# Patient Record
Sex: MALE | Race: ASIAN | NOT HISPANIC OR LATINO | URBAN - METROPOLITAN AREA
[De-identification: names, ages, dates, MRNs, and addresses within clinical notes are randomized per-mention and may not be internally consistent; named-entity substitution may affect disease eponyms.]

---

## 2018-10-19 PROBLEM — 31712002: Status: ACTIVE | Noted: 2018-10-19

## 2019-10-31 PROBLEM — 35489007: Status: ACTIVE | Noted: 2019-09-26

## 2019-10-31 PROBLEM — 196731005 GASTRODUODENITIS: Status: ACTIVE | Noted: 2018-11-06

## 2019-10-31 PROBLEM — 313436004 TYPE II DIABETES MELLITUS WITHOUT COMPLICATION: Status: ACTIVE | Noted: 2018-10-19

## 2019-10-31 PROBLEM — 87522002: Status: ACTIVE | Noted: 2018-10-19

## 2019-10-31 PROBLEM — 161665007: Status: ACTIVE | Noted: 2018-10-19

## 2020-02-06 PROBLEM — 79922009 EPIGASTRIC PAIN: Status: ACTIVE | Noted: 2018-10-19

## 2020-08-06 ENCOUNTER — LAB OUTSIDE AN ENCOUNTER (OUTPATIENT)
Dept: URBAN - METROPOLITAN AREA CLINIC 37 | Facility: CLINIC | Age: 65
End: 2020-08-06

## 2020-08-13 ENCOUNTER — OFFICE VISIT (OUTPATIENT)
Dept: URBAN - METROPOLITAN AREA CLINIC 35 | Facility: CLINIC | Age: 65
End: 2020-08-13

## 2020-08-13 VITALS — WEIGHT: 105 LBS | BODY MASS INDEX: 17.93 KG/M2 | HEIGHT: 64 IN

## 2020-08-13 PROBLEM — 415116008: Status: ACTIVE | Noted: 2020-02-06

## 2020-08-13 RX ORDER — PANTOPRAZOLE 20 MG/1
1 TABLET TABLET, DELAYED RELEASE ORAL
Qty: 90 | Refills: 1 | Status: DISCONTINUED | COMMUNITY
Start: 2020-02-06

## 2020-08-13 RX ORDER — MIRTAZAPINE 30 MG/1
1 TABLET AT BEDTIME TABLET ORAL ONCE A DAY
Qty: 90 TABLET | Refills: 1

## 2020-08-13 RX ORDER — PANTOPRAZOLE 20 MG/1
1 TABLET TABLET, DELAYED RELEASE ORAL
Qty: 90 | Refills: 1

## 2020-08-13 RX ORDER — LINACLOTIDE 145 UG/1
1 CAPSULE CAPSULE, GELATIN COATED ORAL ONCE A DAY
Qty: 90 | Refills: 2

## 2020-08-13 RX ORDER — GLIPIZIDE 10 MG/1
1 TABLET TABLET, EXTENDED RELEASE ORAL ONCE A DAY
Status: ACTIVE | COMMUNITY

## 2020-08-13 RX ORDER — DEFERASIROX 360 MG/1
2 TABLETS TABLET, FILM COATED ORAL QD
Status: ON HOLD | COMMUNITY

## 2020-08-13 RX ORDER — MIRTAZAPINE 30 MG/1
1 TABLET AT BEDTIME TABLET ORAL ONCE A DAY
Qty: 90 TABLET | Refills: 1 | Status: ACTIVE | COMMUNITY

## 2020-08-13 RX ORDER — CYCLOSPORINE 25 MG/1
AS DIRECTED CAPSULE, GELATIN COATED ORAL
Status: ACTIVE | COMMUNITY

## 2020-08-13 RX ORDER — DIPHENHYDRAMINE HYDROCHLORIDE 50 MG/1
1 CAPSULE CAPSULE ORAL BID
Qty: 60 CAPSULE | Refills: 5 | Status: ON HOLD | COMMUNITY

## 2020-08-13 RX ORDER — DIPHENHYDRAMINE HYDROCHLORIDE 50 MG/1
1 CAPSULE CAPSULE ORAL BID
Qty: 60 CAPSULE | Refills: 5

## 2020-08-13 RX ORDER — DEFERASIROX 360 MG/1
2 TABLETS TABLET, FILM COATED ORAL BID
Status: ACTIVE | COMMUNITY

## 2020-08-13 RX ORDER — TAMSULOSIN HYDROCHLORIDE 0.4 MG/1
1 CAPSULE CAPSULE ORAL ONCE A DAY
Qty: 30 | Status: ACTIVE | COMMUNITY

## 2020-08-13 RX ORDER — PANTOPRAZOLE SODIUM 40 MG/1
1 TABLET TABLET, DELAYED RELEASE ORAL
Qty: 90 | Refills: 2 | Status: ACTIVE | COMMUNITY

## 2020-08-13 RX ORDER — LINACLOTIDE 145 UG/1
1 CAPSULE CAPSULE, GELATIN COATED ORAL ONCE A DAY
Qty: 90 | Refills: 1 | Status: ACTIVE | COMMUNITY

## 2020-08-13 RX ORDER — SITAGLIPTIN 100 MG/1
1 TABLET TABLET, FILM COATED ORAL ONCE A DAY
Status: ACTIVE | COMMUNITY

## 2020-08-13 NOTE — HPI-MIGRATED HPI
Interim investigations : Labs done on: ->  * 07/28/2020, ordered by PCP:  - BMP: Glu: 115 (H); BUN: 35 (H); Cr: 1.38 (H); Na: 139; K: 4.0;  - Hepatic function panel: Protein, total: 6.8; Alb: 4.2; ALT: 41; AST: 25; ALP: 126; Tbili: 0.7; Bili, direct: 0.2; Bili, indirect: 0..5 - CBC: WBC: 3.6 (L); RBC: 3.57 (L); Hgb: 10.5 (L); Hct: 31.0 (L); Plt: (test not performed due to significant plt clumping);   Interim investigations : Imaging studies: ->  * US abd on 03/17/18 at [place]: No biliary ductal dilatation. Normal hepatic echogenicity. 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 Mild calyx at upper pole of kidney but no hydronephrosis noted. Simple cyst (1.9 cm) in transplant kidney         * CT scan abd/pelvis w/o IV contrast 12/30/17 at [place]: normal liver size and contour. No gallstones. No intra- or extra-hepatic dilatation. No PD dilatation.          * US Abd on 07/11/19: Transplanted kidney - right pelvis is normal. Normal liver. No focal lesion. CBD 4.8 mm;   Follow up OV : Patient is here for a routine OV for -> Epigastric pain/ Gastritis, chronic insomnia, abnormal liver tests, Thrombocytopenia;   Follow up OV : Last OV was -> 6 months ago * GERD/Eosinophilic Esophagitis:  Last EGD on 01/8/2020, moderate chronic inflammation and reactive changes, but no hyper eosinophilia or dysplasia in the esophagus. Non-specific mild chronic gastritis confirmed through bxx, but no IM or H. pylori Previously on Pantoprazole 40 mg QAM but last OV was advised to decrease to 20 mg daily Current symptoms: No symptoms  * Chronic Insomnia: Patient continues taking Mirtazapine 30 mg daily  * Elevated Liver Enzymes: Has been off Ursodiol since 8/2019 Still taking Diphenhydramine 50 mg BID PRN for pruritus Had recent labs done and here to discuss to see if need to be back on Ursodiol  Thrombocytopenia:  Detected on labs done with PCP on 01/24/2020 with Plt: 21 (L) Had been followed by Heme/Onc at [facility] Had bone anderson biopsy done on 7/22/20 Was told that he has issues with "BM not making blood anymore" Had several blood transfusions already  * Constipation: Has been taking Linzess 145 mcg daily Current BM patterns: one BM QOD, soft stools;

## 2021-02-11 ENCOUNTER — OFFICE VISIT (OUTPATIENT)
Dept: URBAN - METROPOLITAN AREA CLINIC 37 | Facility: CLINIC | Age: 66
End: 2021-02-11

## 2021-02-11 VITALS — WEIGHT: 100 LBS | HEIGHT: 64 IN | BODY MASS INDEX: 17.07 KG/M2

## 2021-02-11 RX ORDER — MIRTAZAPINE 30 MG/1
1 TABLET AT BEDTIME TABLET ORAL ONCE A DAY
Qty: 90 TABLET | Refills: 1

## 2021-02-11 RX ORDER — DIPHENHYDRAMINE HYDROCHLORIDE 50 MG/1
1 CAPSULE CAPSULE ORAL BID
Qty: 60 CAPSULE | Refills: 5

## 2021-02-11 RX ORDER — DIPHENHYDRAMINE HYDROCHLORIDE 50 MG/1
1 CAPSULE CAPSULE ORAL BID
Status: ON HOLD | COMMUNITY

## 2021-02-11 RX ORDER — LINACLOTIDE 145 UG/1
1 CAPSULE CAPSULE, GELATIN COATED ORAL ONCE A DAY
Qty: 90 | Refills: 2 | Status: ACTIVE | COMMUNITY

## 2021-02-11 RX ORDER — PANTOPRAZOLE 20 MG/1
1 TABLET TABLET, DELAYED RELEASE ORAL
Qty: 90 | Refills: 1

## 2021-02-11 RX ORDER — TAMSULOSIN HYDROCHLORIDE 0.4 MG/1
1 CAPSULE CAPSULE ORAL ONCE A DAY
Qty: 30 | Status: ACTIVE | COMMUNITY

## 2021-02-11 RX ORDER — DEFERASIROX 360 MG/1
2 TABLETS TABLET, FILM COATED ORAL QD
Status: ACTIVE | COMMUNITY

## 2021-02-11 RX ORDER — MIRTAZAPINE 30 MG/1
1 TABLET AT BEDTIME TABLET ORAL ONCE A DAY
Qty: 90 TABLET | Refills: 1 | Status: ACTIVE | COMMUNITY

## 2021-02-11 RX ORDER — SITAGLIPTIN 100 MG/1
1 TABLET TABLET, FILM COATED ORAL ONCE A DAY
Status: ACTIVE | COMMUNITY

## 2021-02-11 RX ORDER — PANTOPRAZOLE 20 MG/1
1 TABLET TABLET, DELAYED RELEASE ORAL
Qty: 90 | Refills: 1 | Status: ACTIVE | COMMUNITY

## 2021-02-11 RX ORDER — LINACLOTIDE 145 UG/1
1 CAPSULE CAPSULE, GELATIN COATED ORAL ONCE A DAY
Qty: 90 | Refills: 2

## 2021-02-11 RX ORDER — GLIPIZIDE 10 MG/1
1 TABLET TABLET, EXTENDED RELEASE ORAL ONCE A DAY
Status: ACTIVE | COMMUNITY

## 2021-02-11 RX ORDER — DEFERASIROX 360 MG/1
2 TABLETS TABLET, FILM COATED ORAL BID
Status: ACTIVE | COMMUNITY

## 2021-02-11 RX ORDER — CYCLOSPORINE 25 MG/1
AS DIRECTED CAPSULE, GELATIN COATED ORAL
Status: ACTIVE | COMMUNITY

## 2021-02-11 RX ORDER — PANTOPRAZOLE SODIUM 40 MG/1
1 TABLET TABLET, DELAYED RELEASE ORAL
Qty: 90 | Refills: 2 | Status: DISCONTINUED | COMMUNITY

## 2021-02-11 NOTE — HPI-MIGRATED HPI
Interim investigations : Imaging studies: ->  * US abd on 03/17/18 at [place]: No biliary ductal dilatation. Normal hepatic echogenicity. 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 Mild calyx at upper pole of kidney but no hydronephrosis noted. Simple cyst (1.9 cm) in transplant kidney  * CT scan abd/pelvis w/o IV contrast 12/30/17 at [place]: normal liver size and contour. No gallstones. No intra- or extra-hepatic dilatation. No PD dilatation.   * US Abd on 07/11/19: Transplanted kidney - right pelvis is normal. Normal liver. No focal lesion. CBD 4.8 mm;   Interim investigations : Labs done on: ->  * 02/01/2021, ordered by Hematologist:  - WBC: 4.1 (L); RBC: 2.58 (L); Hgb: 7.4 (L); Hct: 21.4; Plt: 38 (L);   Follow up OV : Patient is here for a routine OV for -> Epigastric pain/ Gastritis, abnormal liver tests, thrombocytopenia;   Follow up OV : Last OV was -> 6 months ago GERD/Eosinophilic Esophagitis:  Last EGD on 01/8/2020, moderate chronic inflammation, but no hyper eosinophilia or dysplasia in the esophagus and mild chronic gastritis  Continues taking Pantoprazole 20 mg daily since 02/2020 Current symptoms: No GERD symptoms  Chronic Insomnia: Patient continues taking Mirtazapine 30 mg daily  Elevated Liver Enzymes: Has been off Ursodiol since 8/2019 Still taking Diphenhydramine 50 mg BID PRN for pruritus No recent labs   Thrombocytopenia:  Detected on labs done with PCP on 01/24/2020 with Plt: 21 (L) Had been followed by Heme/Onc at [facility] (Dr. 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 - On 07/22/2020 had another BM bxx: 10 % cellular, no blast, flow negative. T cell receptor oligocional. NASIR negative. No evidence of PTDL. Patient is currently being treated with Jadenu, Cyclosporine and Prednisone and is transfusion dependent.  Transfusions: PRBC [id number] Last transfusion was 02/01/2021  Constipation: Patient continues taking Linzess 145 mcg daily Current BM patterns: daily BM;

## 2021-08-02 ENCOUNTER — LAB OUTSIDE AN ENCOUNTER (OUTPATIENT)
Dept: URBAN - METROPOLITAN AREA CLINIC 37 | Facility: CLINIC | Age: 66
End: 2021-08-02

## 2021-08-07 LAB
ALBUMIN/GLOBULIN RATIO: 1.7
ALBUMIN: 3.9
ALKALINE PHOSPHATASE: 97
ALT: 30
AST: 14
BILIRUBIN, TOTAL: 0.7
BUN/CREATININE RATIO: 21
CALCIUM: 9.2
CARBON DIOXIDE: 30
CHLORIDE: 106
CREATININE: 1.25
EGFR AFRICAN AMERICAN: 68
EGFR NON-AFR. AMERICAN: 59
GLOBULIN: 2.3
GLUCOSE: 124
POTASSIUM: 3.8
PROTEIN, TOTAL: 6.2
SODIUM: 142
UREA NITROGEN (BUN): 26

## 2021-08-19 ENCOUNTER — OFFICE VISIT (OUTPATIENT)
Dept: URBAN - METROPOLITAN AREA CLINIC 37 | Facility: CLINIC | Age: 66
End: 2021-08-19

## 2021-08-19 VITALS
HEIGHT: 64 IN | WEIGHT: 97 LBS | DIASTOLIC BLOOD PRESSURE: 78 MMHG | SYSTOLIC BLOOD PRESSURE: 106 MMHG | OXYGEN SATURATION: 99 % | HEART RATE: 55 BPM | BODY MASS INDEX: 16.56 KG/M2

## 2021-08-19 RX ORDER — TRAMADOL HYDROCHLORIDE 50 MG/1
1 TABLET AS NEEDED TABLET, FILM COATED ORAL ONCE A DAY
Status: ACTIVE | COMMUNITY

## 2021-08-19 RX ORDER — HYDROXYZINE HYDROCHLORIDE 25 MG/1
1 TABLET AS NEEDED TABLET, FILM COATED ORAL
Qty: 90 | Status: ACTIVE | COMMUNITY

## 2021-08-19 RX ORDER — GLIPIZIDE 10 MG/1
1 TABLET TABLET, EXTENDED RELEASE ORAL ONCE A DAY
Status: ACTIVE | COMMUNITY

## 2021-08-19 RX ORDER — LINACLOTIDE 145 UG/1
1 CAPSULE CAPSULE, GELATIN COATED ORAL ONCE A DAY
Qty: 90 | Refills: 2 | Status: ACTIVE | COMMUNITY

## 2021-08-19 RX ORDER — MIRTAZAPINE 30 MG/1
1 TABLET AT BEDTIME TABLET ORAL ONCE A DAY
Qty: 90 TABLET | Refills: 1 | Status: ACTIVE | COMMUNITY

## 2021-08-19 RX ORDER — PANTOPRAZOLE 20 MG/1
1 TABLET TABLET, DELAYED RELEASE ORAL
Qty: 90 | Refills: 1 | Status: ACTIVE | COMMUNITY

## 2021-08-19 RX ORDER — DEFERASIROX 360 MG/1
2 TABLETS TABLET, FILM COATED ORAL QD
Status: ON HOLD | COMMUNITY

## 2021-08-19 RX ORDER — CYCLOSPORINE 25 MG/1
AS DIRECTED CAPSULE, GELATIN COATED ORAL
Status: ON HOLD | COMMUNITY

## 2021-08-19 RX ORDER — TAMSULOSIN HYDROCHLORIDE 0.4 MG/1
1 CAPSULE CAPSULE ORAL ONCE A DAY
Qty: 30 | Status: ACTIVE | COMMUNITY

## 2021-08-19 RX ORDER — SITAGLIPTIN 100 MG/1
1 TABLET TABLET, FILM COATED ORAL ONCE A DAY
Status: ON HOLD | COMMUNITY

## 2021-08-19 RX ORDER — LINACLOTIDE 145 UG/1
1 CAPSULE CAPSULE, GELATIN COATED ORAL ONCE A DAY
Qty: 90 | Refills: 2

## 2021-08-19 RX ORDER — DIPHENHYDRAMINE HYDROCHLORIDE 50 MG/1
1 CAPSULE CAPSULE ORAL BID
Qty: 60 CAPSULE | Refills: 5 | Status: ON HOLD | COMMUNITY

## 2021-08-19 RX ORDER — MIRTAZAPINE 30 MG/1
1 TABLET AT BEDTIME TABLET ORAL ONCE A DAY
Qty: 90 TABLET | Refills: 2

## 2021-08-19 RX ORDER — PANTOPRAZOLE SODIUM 20 MG/1
1 TABLET QAM TABLET, DELAYED RELEASE ORAL DAILY
Qty: 90 TABLET | Refills: 2

## 2021-08-19 RX ORDER — DIPHENHYDRAMINE HYDROCHLORIDE 50 MG/1
1 CAPSULE CAPSULE ORAL BID
Qty: 60 CAPSULE | Refills: 5

## 2021-08-19 RX ORDER — DEFERASIROX 360 MG/1
2 TABLETS TABLET, FILM COATED ORAL BID
Status: ACTIVE | COMMUNITY

## 2021-08-19 NOTE — HPI-MIGRATED HPI
Interim investigations : Labs done on: ->  * 08/06/2021, see below;   Interim investigations : Imaging studies: ->  * US abd on 03/17/18 at [place]: No biliary ductal dilatation. Normal hepatic echogenicity. 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 Mild calyx at upper pole of kidney but no hydronephrosis noted. Simple cyst (1.9 cm) in transplant kidney  * CT scan abd/pelvis w/o IV contrast 12/30/17 at [place]: normal liver size and contour. No gallstones. No intra- or extra-hepatic dilatation. No PD dilatation.   * US Abd on 07/11/19: Transplanted kidney - right pelvis is normal. Normal liver. No focal lesion. CBD 4.8 mm;   Follow up OV : Patient is here for a routine OV for -> Epigastric pain/ Gastritis, abnormal liver tests, thrombocytopenia;   Follow up OV : Last OV was -> 5 months ago GERD/Eosinophilic Esophagitis:  Last EGD on 01/8/2020, moderate chronic inflammation, but no hyper eosinophilia or dysplasia in the esophagus and mild chronic gastritis  Continues taking Pantoprazole 20 mg daily since 02/2020 due to history of CKD Current symptoms: no abd pain, nor gerd symptoms, + poor appetite  Chronic Insomnia: Patient continues taking Mirtazapine 30 mg daily Reports: sleep well  Elevated Liver Enzymes: Has been off Ursodiol since 8/2019 Still taking Diphenhydramine 50 mg BID PRN for pruritus Patient is here to discuss recent labs, see below  Thrombocytopenia:  Detected on labs done with PCP on 01/24/2020 with Plt: 21 (L) Continues to follow up by Heme/Onc at [facility] (Dr. 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 - On 07/22/2020 had another BM bxx: 10 % cellular, no blast, flow negative. T cell receptor oligocional. NASIR negative. No evidence of PTDL. Patient is currently being treated with Jadenu, Cyclosporine and Prednisone and is transfusion dependent.  Transfusions: PRBC [id number]  Constipation: Patient continues taking Linzess 145 mcg daily Current BM patterns: QOD BM while on Linzess.  + constipation if he stops Linzess;

## 2021-08-19 NOTE — EXAM-MIGRATED EXAMINATIONS
GENERAL APPEARANCE: - alert, in no acute distress, well developed, slightly underweight;   HEAD: - normocephalic, atraumatic;   EYES: - sclera anicteric bilaterally;   EARS: - normal;   ORAL CAVITY: - mucosa moist;   THROAT: - clear;   NECK/THYROID: - neck supple, full range of motion, no cervical lymphadenopathy, no thyroid nodules, no thyromegaly, trachea midline;   LYMPH NODES: - no cervical adenopathy, no supraclavicular adenopathy, no periumbilical adenopathy;   SKIN: - no suspicious lesions, warm and dry, no spider angiomata, palmar erythema or icterus;   HEART: - no murmurs, regular rate and rhythm, S1, S2 normal;   LUNGS: - clear to auscultation bilaterally, good air movement, no wheezes, rales, rhonchi;   ABDOMEN: - bowel sounds present, no masses palpable, no organomegaly , no rebound tenderness, soft, nontender, nondistended;   RECTAL: - deferred by patient;   MUSCULOSKELETAL: - normal posture, normal gait and station, no decreased range of motion;   EXTREMITIES: - no clubbing, cyanosis, or edema;   NEUROLOGIC: - cranial nerves 2-12 grossly intact;   PSYCH: - cooperative with exam, mood/affect full range;

## 2022-02-19 ENCOUNTER — LAB OUTSIDE AN ENCOUNTER (OUTPATIENT)
Dept: URBAN - METROPOLITAN AREA CLINIC 37 | Facility: CLINIC | Age: 67
End: 2022-02-19

## 2022-02-24 LAB
A/G RATIO: 1.9
ALBUMIN: 3.9
ALKALINE PHOSPHATASE: 65
ALT (SGPT): 15
AST (SGOT): 12
BILIRUBIN, TOTAL: 0.7
BUN/CREATININE RATIO: 18
BUN: 30
CALCIUM: 9.1
CARBON DIOXIDE, TOTAL: 24
CHLORIDE: 105
CREATININE: 1.63
EGFR AFRICAN AMERICAN: 49
EGFR NON-AFR. AMERICAN: 43
GLOBULIN, TOTAL: 2.1
GLUCOSE: 139
POTASSIUM: 3.9
PROTEIN, TOTAL: 6
SODIUM: 135

## 2022-03-03 ENCOUNTER — OFFICE VISIT (OUTPATIENT)
Dept: URBAN - METROPOLITAN AREA CLINIC 37 | Facility: CLINIC | Age: 67
End: 2022-03-03
Payer: MEDICARE

## 2022-03-03 VITALS
OXYGEN SATURATION: 98 % | HEIGHT: 64 IN | BODY MASS INDEX: 16.39 KG/M2 | DIASTOLIC BLOOD PRESSURE: 70 MMHG | WEIGHT: 96 LBS | SYSTOLIC BLOOD PRESSURE: 102 MMHG | HEART RATE: 58 BPM

## 2022-03-03 DIAGNOSIS — K21.9 ACID REFLUX: ICD-10-CM

## 2022-03-03 DIAGNOSIS — R74.8 ELEVATED LIVER ENZYMES: ICD-10-CM

## 2022-03-03 DIAGNOSIS — K59.04 CHRONIC IDIOPATHIC CONSTIPATION: ICD-10-CM

## 2022-03-03 DIAGNOSIS — L29.9 PRURITUS: ICD-10-CM

## 2022-03-03 PROBLEM — 166603001 ABNORMAL RESULTS OF LIVER FUNCTION STUDIES: Status: ACTIVE | Noted: 2018-10-19

## 2022-03-03 PROBLEM — 274774002: Status: ACTIVE | Noted: 2019-06-20

## 2022-03-03 PROBLEM — 418363000 PRURITUS: Status: ACTIVE | Noted: 2018-10-19

## 2022-03-03 PROBLEM — 707724006: Status: ACTIVE | Noted: 2022-03-03

## 2022-03-03 PROCEDURE — 99214 OFFICE O/P EST MOD 30 MIN: CPT | Performed by: INTERNAL MEDICINE

## 2022-03-03 RX ORDER — MIRTAZAPINE 30 MG/1
1 TABLET AT BEDTIME TABLET ORAL ONCE A DAY
Qty: 90 TABLET | Refills: 2 | Status: ACTIVE | COMMUNITY

## 2022-03-03 RX ORDER — TAMSULOSIN HYDROCHLORIDE 0.4 MG/1
1 CAPSULE CAPSULE ORAL ONCE A DAY
Qty: 30 | Status: ACTIVE | COMMUNITY

## 2022-03-03 RX ORDER — PANTOPRAZOLE SODIUM 20 MG/1
1 TABLET QAM TABLET, DELAYED RELEASE ORAL DAILY
Qty: 90 TABLET | Refills: 2 | Status: ACTIVE | COMMUNITY

## 2022-03-03 RX ORDER — DEFERASIROX 360 MG/1
2 TABLETS TABLET, FILM COATED ORAL QD
Status: ON HOLD | COMMUNITY

## 2022-03-03 RX ORDER — MIRTAZAPINE 30 MG/1
1 TABLET AT BEDTIME TABLET ORAL ONCE A DAY
Qty: 90 TABLET | Refills: 4

## 2022-03-03 RX ORDER — TRAMADOL HYDROCHLORIDE 50 MG/1
1 TABLET AS NEEDED TABLET, FILM COATED ORAL BID
Status: ACTIVE | COMMUNITY

## 2022-03-03 RX ORDER — DIPHENHYDRAMINE HYDROCHLORIDE 50 MG/1
1 CAPSULE CAPSULE ORAL BID
Qty: 60 CAPSULE | Refills: 5

## 2022-03-03 RX ORDER — ZOLPIDEM TARTRATE 10 MG/1
1 TABLET AT BEDTIME AS NEEDED TABLET, FILM COATED ORAL ONCE A DAY
Status: ACTIVE | COMMUNITY

## 2022-03-03 RX ORDER — LINACLOTIDE 290 UG/1
1 CAPSULE CAPSULE, GELATIN COATED ORAL ONCE A DAY
Qty: 90 | Refills: 2 | OUTPATIENT

## 2022-03-03 RX ORDER — HYDROXYZINE HYDROCHLORIDE 25 MG/1
1 TABLET AS NEEDED TABLET, FILM COATED ORAL QHS
Status: ON HOLD | COMMUNITY

## 2022-03-03 RX ORDER — LINACLOTIDE 145 UG/1
1 CAPSULE CAPSULE, GELATIN COATED ORAL ONCE A DAY
Qty: 90 | Refills: 2 | Status: ACTIVE | COMMUNITY

## 2022-03-03 RX ORDER — DEFERASIROX 360 MG/1
2 TABLETS TABLET, FILM COATED ORAL QD
Status: ACTIVE | COMMUNITY

## 2022-03-03 RX ORDER — SITAGLIPTIN 100 MG/1
1 TABLET TABLET, FILM COATED ORAL ONCE A DAY
Status: ACTIVE | COMMUNITY

## 2022-03-03 RX ORDER — METFORMIN HYDROCHLORIDE 500 MG/1
1 TABLET WITH A MEAL TABLET, FILM COATED ORAL ONCE A DAY
Status: ACTIVE | COMMUNITY

## 2022-03-03 RX ORDER — DIPHENHYDRAMINE HYDROCHLORIDE 50 MG/1
1 CAPSULE CAPSULE ORAL BID
Qty: 60 CAPSULE | Refills: 5 | Status: ON HOLD | COMMUNITY

## 2022-03-03 RX ORDER — CYCLOSPORINE 25 MG/1
AS DIRECTED CAPSULE, GELATIN COATED ORAL
Status: ON HOLD | COMMUNITY

## 2022-03-03 RX ORDER — PANTOPRAZOLE SODIUM 20 MG/1
1 TABLET QAM TABLET, DELAYED RELEASE ORAL DAILY
Qty: 90 TABLET | Refills: 4

## 2022-03-03 RX ORDER — GLIPIZIDE 10 MG/1
1 TABLET TABLET, EXTENDED RELEASE ORAL ONCE A DAY
Status: ON HOLD | COMMUNITY

## 2022-03-03 NOTE — HPI-ELEVATED LIVER ENZYMES
Detected on labs done several years ago Patient was previously taking Ursodiol 250- 300 mg and discontinued medication since 08/2019. Patient is taking Diphenhydramine HCl 50 mg BID for pruritus.  Patient is here to discuss recent labs, see below

## 2022-03-03 NOTE — HPI-CONSTIPATION
Patient is here to follow for constipation. Last OV 6 months ago Patient continues taking Linzess 145 mcg daily. Current BM: one BM every 3 days with hard stools (BS1) Patient reports BRB on the toilet bowel with every BM Patien reports loss appetize, fatigue, abdominal pain

## 2022-09-01 ENCOUNTER — LAB OUTSIDE AN ENCOUNTER (OUTPATIENT)
Dept: URBAN - METROPOLITAN AREA CLINIC 37 | Facility: CLINIC | Age: 67
End: 2022-09-01

## 2022-09-02 LAB
A/G RATIO: 1.6
ALBUMIN: 3.9
ALKALINE PHOSPHATASE: 75
ALT (SGPT): 18
AST (SGOT): 13
BILIRUBIN, TOTAL: 0.7
BUN/CREATININE RATIO: 27
BUN: 35
CALCIUM: 9.5
CARBON DIOXIDE, TOTAL: 24
CHLORIDE: 108
CREATININE: 1.32
EGFR: 58
GLOBULIN, TOTAL: 2.5
GLUCOSE: 110
POTASSIUM: 4.1
PROTEIN, TOTAL: 6.4
SODIUM: 141

## 2022-09-15 ENCOUNTER — OFFICE VISIT (OUTPATIENT)
Dept: URBAN - METROPOLITAN AREA CLINIC 37 | Facility: CLINIC | Age: 67
End: 2022-09-15
Payer: MEDICARE

## 2022-09-15 VITALS
HEART RATE: 99 BPM | HEIGHT: 64 IN | BODY MASS INDEX: 17.58 KG/M2 | OXYGEN SATURATION: 99 % | DIASTOLIC BLOOD PRESSURE: 64 MMHG | SYSTOLIC BLOOD PRESSURE: 110 MMHG | WEIGHT: 103 LBS

## 2022-09-15 DIAGNOSIS — R74.0 NONSPECIFIC ELEVATION OF LEVELS OF TRANSAMINASE AND LACTIC ACID DEHYDROGENASE (LDH): ICD-10-CM

## 2022-09-15 DIAGNOSIS — D61.01: ICD-10-CM

## 2022-09-15 DIAGNOSIS — L29.9 PRURITUS: ICD-10-CM

## 2022-09-15 DIAGNOSIS — K20.0 EOSINOPHILIC ESOPHAGITIS: ICD-10-CM

## 2022-09-15 DIAGNOSIS — K59.04 CHRONIC IDIOPATHIC CONSTIPATION: ICD-10-CM

## 2022-09-15 DIAGNOSIS — F51.04 CHRONIC INSOMNIA: ICD-10-CM

## 2022-09-15 DIAGNOSIS — K21.0 GASTROESOPHAGEAL REFLUX DISEASE WITH ESOPHAGITIS: ICD-10-CM

## 2022-09-15 DIAGNOSIS — R74.8 ELEVATED LIVER ENZYMES: ICD-10-CM

## 2022-09-15 PROCEDURE — 99214 OFFICE O/P EST MOD 30 MIN: CPT | Performed by: INTERNAL MEDICINE

## 2022-09-15 RX ORDER — SITAGLIPTIN 100 MG/1
1 TABLET TABLET, FILM COATED ORAL ONCE A DAY
Status: ACTIVE | COMMUNITY

## 2022-09-15 RX ORDER — PANTOPRAZOLE SODIUM 20 MG/1
1 TABLET QAM TABLET, DELAYED RELEASE ORAL DAILY
Qty: 90 TABLET | Refills: 4 | Status: ACTIVE | COMMUNITY

## 2022-09-15 RX ORDER — TAMSULOSIN HYDROCHLORIDE 0.4 MG/1
1 CAPSULE CAPSULE ORAL ONCE A DAY
Qty: 30 | Status: ACTIVE | COMMUNITY

## 2022-09-15 RX ORDER — DEFERASIROX 360 MG/1
2 TABLETS TABLET, FILM COATED ORAL QD
Status: ACTIVE | COMMUNITY

## 2022-09-15 RX ORDER — DIPHENHYDRAMINE HYDROCHLORIDE 50 MG/1
1 CAPSULE CAPSULE ORAL BID
Qty: 60 CAPSULE | Refills: 5 | Status: ON HOLD | COMMUNITY

## 2022-09-15 RX ORDER — LINACLOTIDE 290 UG/1
1 CAPSULE AT LEAST 30 MINUTES BEFORE THE FIRST MEAL OF THE DAY ON AN EMPTY STOMACH CAPSULE, GELATIN COATED ORAL ONCE A DAY
Status: ACTIVE | COMMUNITY

## 2022-09-15 RX ORDER — HYDROXYZINE HYDROCHLORIDE 25 MG/1
1 TABLET AS NEEDED TABLET, FILM COATED ORAL QHS
Status: ACTIVE | COMMUNITY

## 2022-09-15 RX ORDER — LINACLOTIDE 290 UG/1
1 CAPSULE CAPSULE, GELATIN COATED ORAL ONCE A DAY
Qty: 90 | Refills: 4

## 2022-09-15 RX ORDER — CYCLOSPORINE 25 MG/1
AS DIRECTED CAPSULE, GELATIN COATED ORAL
Status: ON HOLD | COMMUNITY

## 2022-09-15 RX ORDER — PANTOPRAZOLE SODIUM 20 MG/1
1 TABLET QAM TABLET, DELAYED RELEASE ORAL DAILY
Qty: 90 TABLET | Refills: 4

## 2022-09-15 RX ORDER — LINACLOTIDE 145 UG/1
1 CAPSULE AT LEAST 30 MINUTES BEFORE THE FIRST MEAL OF THE DAY ON AN EMPTY STOMACH CAPSULE, GELATIN COATED ORAL ONCE A DAY
Status: DISCONTINUED | COMMUNITY

## 2022-09-15 RX ORDER — METFORMIN HYDROCHLORIDE 500 MG/1
1 TABLET WITH A MEAL TABLET, FILM COATED ORAL ONCE A DAY
Status: ACTIVE | COMMUNITY

## 2022-09-15 RX ORDER — DIPHENHYDRAMINE HYDROCHLORIDE 50 MG/1
1 CAPSULE CAPSULE ORAL BID
Qty: 60 CAPSULE | Refills: 5 | OUTPATIENT

## 2022-09-15 RX ORDER — TRAMADOL HYDROCHLORIDE 50 MG/1
1 TABLET AS NEEDED TABLET, FILM COATED ORAL BID
Status: ON HOLD | COMMUNITY

## 2022-09-15 RX ORDER — MIRTAZAPINE 30 MG/1
1 TABLET AT BEDTIME TABLET ORAL ONCE A DAY
Qty: 90 TABLET | Refills: 4

## 2022-09-15 RX ORDER — ZOLPIDEM TARTRATE 10 MG/1
1 TABLET AT BEDTIME AS NEEDED TABLET, FILM COATED ORAL ONCE A DAY
Status: ON HOLD | COMMUNITY

## 2022-09-15 RX ORDER — MIRTAZAPINE 30 MG/1
1 TABLET AT BEDTIME TABLET ORAL ONCE A DAY
Qty: 90 TABLET | Refills: 4 | Status: ACTIVE | COMMUNITY

## 2022-09-15 RX ORDER — DEFERASIROX 360 MG/1
2 TABLETS TABLET, FILM COATED ORAL QD
Status: ON HOLD | COMMUNITY

## 2022-09-15 RX ORDER — GLIPIZIDE 10 MG/1
1 TABLET TABLET, EXTENDED RELEASE ORAL ONCE A DAY
Status: ON HOLD | COMMUNITY

## 2022-09-15 NOTE — HPI-ELEVATED LIVER ENZYMES
Patient is here for a routine follow up for Elevated liver enzymes. Last OV was 6 months ago Detected on labs done several years ago Patient was previously taking Ursodiol 250- 300 mg and discontinued medication since 08/2019. Patient is taking Diphenhydramine HCl 50 mg BID for pruritus.  Patient is here to discuss recent labs, see below

## 2022-09-15 NOTE — HPI-CONSTIPATION
Patient is here to follow for constipation. Last OV 6 months ago Patient was previously on Linzess 145 mcg daily but had 1 BM every 3 days, with type 1 stools on BS scale.  Therefore, advised to increase Linzess to 290 mcg daily Current BM: soft BM QOD Patient denies rectal bleeding, blood in stool or melena

## 2023-03-02 PROBLEM — 266433003 GASTROESOPHAGEAL REFLUX DISEASE WITH ESOPHAGITIS: Status: ACTIVE | Noted: 2018-11-06

## 2023-03-02 PROBLEM — 82934008: Status: ACTIVE | Noted: 2019-09-26

## 2023-03-02 PROBLEM — 235599003: Status: ACTIVE | Noted: 2018-11-08

## 2023-03-02 PROBLEM — 50715003: Status: ACTIVE | Noted: 2021-02-11

## 2023-03-02 PROBLEM — 724748004: Status: ACTIVE | Noted: 2018-10-19

## 2023-03-16 ENCOUNTER — OFFICE VISIT (OUTPATIENT)
Dept: URBAN - METROPOLITAN AREA CLINIC 37 | Facility: CLINIC | Age: 68
End: 2023-03-16
Payer: MEDICARE

## 2023-03-16 VITALS
WEIGHT: 102 LBS | OXYGEN SATURATION: 98 % | BODY MASS INDEX: 17.42 KG/M2 | HEIGHT: 64 IN | DIASTOLIC BLOOD PRESSURE: 72 MMHG | HEART RATE: 83 BPM | SYSTOLIC BLOOD PRESSURE: 114 MMHG

## 2023-03-16 DIAGNOSIS — K59.04 CHRONIC IDIOPATHIC CONSTIPATION: ICD-10-CM

## 2023-03-16 DIAGNOSIS — R74.8 ELEVATED LIVER ENZYMES: ICD-10-CM

## 2023-03-16 DIAGNOSIS — K21.9 ACID REFLUX: ICD-10-CM

## 2023-03-16 DIAGNOSIS — L29.9 PRURITUS: ICD-10-CM

## 2023-03-16 PROCEDURE — 99214 OFFICE O/P EST MOD 30 MIN: CPT | Performed by: INTERNAL MEDICINE

## 2023-03-16 RX ORDER — LINACLOTIDE 290 UG/1
1 CAPSULE CAPSULE, GELATIN COATED ORAL ONCE A DAY
Qty: 90 | Refills: 4 | Status: ACTIVE | COMMUNITY

## 2023-03-16 RX ORDER — PANTOPRAZOLE SODIUM 20 MG/1
1 TABLET QAM TABLET, DELAYED RELEASE ORAL DAILY
Qty: 90 TABLET | Refills: 4 | Status: ACTIVE | COMMUNITY

## 2023-03-16 RX ORDER — ZOLPIDEM TARTRATE 10 MG/1
1 TABLET AT BEDTIME AS NEEDED TABLET, FILM COATED ORAL ONCE A DAY
Status: ON HOLD | COMMUNITY

## 2023-03-16 RX ORDER — ELTROMBOPAG OLAMINE 50 MG/1
1 TABLET ON AN EMPTY STOMACH 1 HOUR BEFORE OR 2 HOURS AFTER A MEAL TABLET, FILM COATED ORAL ONCE A DAY
Status: ACTIVE | COMMUNITY

## 2023-03-16 RX ORDER — DIPHENHYDRAMINE HYDROCHLORIDE 50 MG/1
1 CAPSULE CAPSULE ORAL BID
Qty: 60 CAPSULE | Refills: 5 | Status: ON HOLD | COMMUNITY

## 2023-03-16 RX ORDER — SITAGLIPTIN 100 MG/1
1 TABLET TABLET, FILM COATED ORAL ONCE A DAY
Status: ACTIVE | COMMUNITY

## 2023-03-16 RX ORDER — DEFERASIROX 360 MG/1
2 TABLETS TABLET, FILM COATED ORAL QD
Status: ON HOLD | COMMUNITY

## 2023-03-16 RX ORDER — LACTULOSE 10 G/15ML
15 ML SOLUTION ORAL
Qty: 900 MILLILITER | Refills: 5 | OUTPATIENT
Start: 2023-03-16 | End: 2023-09-12

## 2023-03-16 RX ORDER — CYCLOSPORINE 25 MG/1
AS DIRECTED CAPSULE, GELATIN COATED ORAL
Status: ON HOLD | COMMUNITY

## 2023-03-16 RX ORDER — DIPHENHYDRAMINE HYDROCHLORIDE 50 MG/1
1 CAPSULE CAPSULE ORAL BID
Qty: 60 CAPSULE | Refills: 5 | OUTPATIENT

## 2023-03-16 RX ORDER — MIRTAZAPINE 30 MG/1
1 TABLET AT BEDTIME TABLET ORAL ONCE A DAY
Qty: 90 TABLET | Refills: 4 | Status: ACTIVE | COMMUNITY

## 2023-03-16 RX ORDER — METFORMIN HYDROCHLORIDE 500 MG/1
1 TABLET WITH A MEAL TABLET, FILM COATED ORAL ONCE A DAY
Status: ACTIVE | COMMUNITY

## 2023-03-16 RX ORDER — LINACLOTIDE 290 UG/1
1 CAPSULE CAPSULE, GELATIN COATED ORAL ONCE A DAY
Qty: 90 | Refills: 4

## 2023-03-16 RX ORDER — HYDROXYZINE HYDROCHLORIDE 25 MG/1
1 TABLET AS NEEDED TABLET, FILM COATED ORAL QHS
Status: ACTIVE | COMMUNITY

## 2023-03-16 RX ORDER — LINACLOTIDE 290 UG/1
1 CAPSULE AT LEAST 30 MINUTES BEFORE THE FIRST MEAL OF THE DAY ON AN EMPTY STOMACH CAPSULE, GELATIN COATED ORAL ONCE A DAY
Status: ON HOLD | COMMUNITY

## 2023-03-16 RX ORDER — MIRTAZAPINE 30 MG/1
1 TABLET AT BEDTIME TABLET ORAL ONCE A DAY
Qty: 90 TABLET | Refills: 4

## 2023-03-16 RX ORDER — PANTOPRAZOLE SODIUM 20 MG/1
1 TABLET QAM TABLET, DELAYED RELEASE ORAL DAILY
Qty: 90 TABLET | Refills: 4

## 2023-03-16 RX ORDER — TAMSULOSIN HYDROCHLORIDE 0.4 MG/1
1 CAPSULE CAPSULE ORAL ONCE A DAY
Qty: 30 | Status: ACTIVE | COMMUNITY

## 2023-03-16 RX ORDER — TRAMADOL HYDROCHLORIDE 50 MG/1
1 TABLET AS NEEDED TABLET, FILM COATED ORAL BID
Status: ON HOLD | COMMUNITY

## 2023-03-16 RX ORDER — GLIPIZIDE 10 MG/1
1 TABLET TABLET, EXTENDED RELEASE ORAL ONCE A DAY
Status: ON HOLD | COMMUNITY

## 2023-03-16 NOTE — HPI-ELEVATED LIVER ENZYMES
Patient is here for a routine follow up for Elevated liver enzymes. Last OV was 6 months ago Detected on labs done several years ago. Patient was initaly referred by PCP due to AMA 41.3  Patient was previously taking Ursodiol 250- 300 mg and discontinued medication since 08/2019. Patient is taking Diphenhydramine HCl 50 mg BID for pruritus.  Patient is here to discuss recent labs, see below

## 2023-03-16 NOTE — HPI-CONSTIPATION
Patient is here to follow for constipation. Last OV 6 months ago Patient was previously on Linzess 145 mcg daily but had 1 BM every 3 days, with type 1 stools on BS scale.  Therefore, advised to increase Linzess to 290 mcg daily Current BM:   1 BM with hard lump stool QOD  Patient denies rectal bleeding, blood in stool or melena

## 2023-09-14 ENCOUNTER — OFFICE VISIT (OUTPATIENT)
Dept: URBAN - METROPOLITAN AREA CLINIC 37 | Facility: CLINIC | Age: 68
End: 2023-09-14
Payer: MEDICARE

## 2023-09-14 VITALS
BODY MASS INDEX: 17.07 KG/M2 | SYSTOLIC BLOOD PRESSURE: 108 MMHG | OXYGEN SATURATION: 96 % | WEIGHT: 100 LBS | HEIGHT: 64 IN | HEART RATE: 85 BPM | DIASTOLIC BLOOD PRESSURE: 58 MMHG

## 2023-09-14 DIAGNOSIS — K21.00 BILE REFLUX ESOPHAGITIS: ICD-10-CM

## 2023-09-14 DIAGNOSIS — R74.8 ELEVATED LIVER ENZYMES: ICD-10-CM

## 2023-09-14 DIAGNOSIS — K59.04 CHRONIC IDIOPATHIC CONSTIPATION: ICD-10-CM

## 2023-09-14 DIAGNOSIS — K20.0 EOSINOPHILIC ESOPHAGITIS: ICD-10-CM

## 2023-09-14 PROCEDURE — 99214 OFFICE O/P EST MOD 30 MIN: CPT | Performed by: INTERNAL MEDICINE

## 2023-09-14 RX ORDER — HYDROXYZINE HYDROCHLORIDE 25 MG/1
1 TABLET AS NEEDED TABLET, FILM COATED ORAL QHS
Status: ACTIVE | COMMUNITY

## 2023-09-14 RX ORDER — SITAGLIPTIN 100 MG/1
1 TABLET TABLET, FILM COATED ORAL ONCE A DAY
Status: ACTIVE | COMMUNITY

## 2023-09-14 RX ORDER — DEFERASIROX 360 MG/1
2 TABLETS TABLET, FILM COATED ORAL QD
Status: ON HOLD | COMMUNITY

## 2023-09-14 RX ORDER — GLIPIZIDE 10 MG/1
1 TABLET TABLET, EXTENDED RELEASE ORAL ONCE A DAY
Status: ON HOLD | COMMUNITY

## 2023-09-14 RX ORDER — LINACLOTIDE 290 UG/1
1 CAPSULE CAPSULE, GELATIN COATED ORAL ONCE A DAY
Qty: 90 | Refills: 4

## 2023-09-14 RX ORDER — ZOLPIDEM TARTRATE 10 MG/1
1 TABLET AT BEDTIME AS NEEDED TABLET, FILM COATED ORAL ONCE A DAY
Status: ON HOLD | COMMUNITY

## 2023-09-14 RX ORDER — ELTROMBOPAG OLAMINE 50 MG/1
1 TABLET ON AN EMPTY STOMACH 1 HOUR BEFORE OR 2 HOURS AFTER A MEAL TABLET, FILM COATED ORAL ONCE A DAY
Status: ACTIVE | COMMUNITY

## 2023-09-14 RX ORDER — METFORMIN HYDROCHLORIDE 500 MG/1
1 TABLET WITH A MEAL TABLET, FILM COATED ORAL ONCE A DAY
Status: ACTIVE | COMMUNITY

## 2023-09-14 RX ORDER — DIPHENHYDRAMINE HYDROCHLORIDE 50 MG/1
1 CAPSULE CAPSULE ORAL BID
Qty: 60 CAPSULE | Refills: 5 | Status: ACTIVE | COMMUNITY

## 2023-09-14 RX ORDER — MIRTAZAPINE 30 MG/1
1 TABLET AT BEDTIME TABLET ORAL ONCE A DAY
Qty: 90 TABLET | Refills: 4 | Status: ACTIVE | COMMUNITY

## 2023-09-14 RX ORDER — LINACLOTIDE 290 UG/1
1 CAPSULE CAPSULE, GELATIN COATED ORAL ONCE A DAY
Qty: 90 | Refills: 4 | Status: ACTIVE | COMMUNITY

## 2023-09-14 RX ORDER — DIPHENHYDRAMINE HYDROCHLORIDE 50 MG/1
1 CAPSULE CAPSULE ORAL BID
Qty: 60 CAPSULE | Refills: 5 | OUTPATIENT

## 2023-09-14 RX ORDER — PANTOPRAZOLE SODIUM 20 MG/1
1 TABLET QAM TABLET, DELAYED RELEASE ORAL DAILY
Qty: 90 TABLET | Refills: 4 | Status: ACTIVE | COMMUNITY

## 2023-09-14 RX ORDER — MIRTAZAPINE 30 MG/1
1 TABLET AT BEDTIME TABLET ORAL ONCE A DAY
Qty: 90 TABLET | Refills: 4

## 2023-09-14 RX ORDER — PANTOPRAZOLE SODIUM 20 MG/1
1 TABLET QAM TABLET, DELAYED RELEASE ORAL DAILY
Qty: 90 TABLET | Refills: 4

## 2023-09-14 RX ORDER — TRAMADOL HYDROCHLORIDE 50 MG/1
1 TABLET AS NEEDED TABLET, FILM COATED ORAL BID
Status: ON HOLD | COMMUNITY

## 2023-09-14 RX ORDER — CYCLOSPORINE 25 MG/1
AS DIRECTED CAPSULE, GELATIN COATED ORAL
Status: ON HOLD | COMMUNITY

## 2023-09-14 RX ORDER — TAMSULOSIN HYDROCHLORIDE 0.4 MG/1
1 CAPSULE CAPSULE ORAL ONCE A DAY
Qty: 30 | Status: ACTIVE | COMMUNITY

## 2023-09-14 NOTE — HPI-ELEVATED LIVER ENZYMES
Patient is here for a routine follow up for Elevated liver enzymes. Last OV was 6 months ago Detected on labs done several years ago. Patient was initaly referred by PCP due to AMA 41.3  Patient was previously taking Ursodiol 250- 300 mg and discontinued medication since 08/2019. Patient is taking Diphenhydramine HCl 50 mg BID for pruritus.  Patient notes that he has not completed labs since his "blood levels are low". Patient continues to endorse pruritus even with medication

## 2023-09-14 NOTE — HPI-CONSTIPATION
Patient is here to follow for constipation. Last OV 6 months ago Patient was previously on Linzess 145 mcg daily but had 1 BM every 3 days, with type 1 stools on BS scale.  Therefore, advised to increase Linzess to 290 mcg daily but still had 1 BM QOD. Therefore, advised to add Lactulose 15 mL QD along with Linzess 290 mcg daily  Current BM: 1 BM daily with both Lactulose and Linzenss. Patient reports that on days that he has multiple BM, he will only take the Lactulose, and when he has constipation for 2-3 days, he will take the Linzess in addition to Lactulose. Patient denies rectal bleeding, blood in stool or melena

## 2024-03-14 ENCOUNTER — OV EP (OUTPATIENT)
Dept: URBAN - METROPOLITAN AREA CLINIC 37 | Facility: CLINIC | Age: 69
End: 2024-03-14

## 2024-04-04 ENCOUNTER — OV EP (OUTPATIENT)
Dept: URBAN - METROPOLITAN AREA CLINIC 37 | Facility: CLINIC | Age: 69
End: 2024-04-04
Payer: MEDICARE

## 2024-04-04 VITALS
DIASTOLIC BLOOD PRESSURE: 60 MMHG | HEART RATE: 95 BPM | OXYGEN SATURATION: 99 % | BODY MASS INDEX: 16.22 KG/M2 | SYSTOLIC BLOOD PRESSURE: 106 MMHG | HEIGHT: 64 IN | WEIGHT: 95 LBS

## 2024-04-04 DIAGNOSIS — D61.01: ICD-10-CM

## 2024-04-04 DIAGNOSIS — K21.00 ALKALINE REFLUX ESOPHAGITIS: ICD-10-CM

## 2024-04-04 DIAGNOSIS — L29.9 PRURITUS: ICD-10-CM

## 2024-04-04 DIAGNOSIS — K59.09 CHANGE IN BOWEL MOVEMENTS INTERMITTENT CONSTIPATION. URGENCY IN THE MORNING.: ICD-10-CM

## 2024-04-04 PROCEDURE — 99214 OFFICE O/P EST MOD 30 MIN: CPT | Performed by: INTERNAL MEDICINE

## 2024-04-04 RX ORDER — LACTULOSE 10 G/15ML
15 ML AS NEEDED SOLUTION ORAL
Qty: 900 MILLILITER | Refills: 11 | OUTPATIENT
Start: 2024-04-04 | End: 2025-03-30

## 2024-04-04 RX ORDER — TAMSULOSIN HYDROCHLORIDE 0.4 MG/1
1 CAPSULE CAPSULE ORAL ONCE A DAY
Qty: 30 | Status: ACTIVE | COMMUNITY

## 2024-04-04 RX ORDER — CYCLOSPORINE 25 MG/1
AS DIRECTED CAPSULE, GELATIN COATED ORAL
Status: ON HOLD | COMMUNITY

## 2024-04-04 RX ORDER — ZOLPIDEM TARTRATE 10 MG/1
1 TABLET AT BEDTIME AS NEEDED TABLET, FILM COATED ORAL ONCE A DAY
Status: ON HOLD | COMMUNITY

## 2024-04-04 RX ORDER — HYDROXYZINE HYDROCHLORIDE 25 MG/1
1 TABLET AS NEEDED TABLET, FILM COATED ORAL QHS
Status: ACTIVE | COMMUNITY

## 2024-04-04 RX ORDER — DEFERASIROX 360 MG/1
2 TABLETS TABLET, FILM COATED ORAL QD
Status: ON HOLD | COMMUNITY

## 2024-04-04 RX ORDER — PANTOPRAZOLE SODIUM 20 MG/1
1 TABLET 30 MINUTES BEFORE BREAKFAST AND DINNER TABLET, DELAYED RELEASE ORAL DAILY
Qty: 180 | Refills: 3

## 2024-04-04 RX ORDER — MIRTAZAPINE 30 MG/1
1 TABLET AT BEDTIME TABLET ORAL ONCE A DAY
Qty: 90 TABLET | Refills: 4

## 2024-04-04 RX ORDER — LINACLOTIDE 290 UG/1
1 CAPSULE CAPSULE, GELATIN COATED ORAL ONCE A DAY
Qty: 90 | Refills: 4

## 2024-04-04 RX ORDER — GLIPIZIDE 10 MG/1
1 TABLET TABLET, EXTENDED RELEASE ORAL ONCE A DAY
Status: ON HOLD | COMMUNITY

## 2024-04-04 RX ORDER — DIPHENHYDRAMINE HYDROCHLORIDE 50 MG/1
1 CAPSULE CAPSULE ORAL BID
Qty: 60 CAPSULE | Refills: 5 | OUTPATIENT

## 2024-04-04 RX ORDER — DIPHENHYDRAMINE HYDROCHLORIDE 50 MG/1
1 CAPSULE CAPSULE ORAL BID
Qty: 60 CAPSULE | Refills: 5 | Status: ACTIVE | COMMUNITY

## 2024-04-04 RX ORDER — LINACLOTIDE 290 UG/1
1 CAPSULE CAPSULE, GELATIN COATED ORAL ONCE A DAY
Qty: 90 | Refills: 4 | Status: ACTIVE | COMMUNITY

## 2024-04-04 RX ORDER — ELTROMBOPAG OLAMINE 50 MG/1
1 TABLET ON AN EMPTY STOMACH 1 HOUR BEFORE OR 2 HOURS AFTER A MEAL TABLET, FILM COATED ORAL ONCE A DAY
Status: ACTIVE | COMMUNITY

## 2024-04-04 RX ORDER — PANTOPRAZOLE SODIUM 20 MG/1
1 TABLET QAM TABLET, DELAYED RELEASE ORAL DAILY
Qty: 90 TABLET | Refills: 4 | Status: ACTIVE | COMMUNITY

## 2024-04-04 RX ORDER — MIRTAZAPINE 30 MG/1
1 TABLET AT BEDTIME TABLET ORAL ONCE A DAY
Qty: 90 TABLET | Refills: 4 | Status: ACTIVE | COMMUNITY

## 2024-04-04 RX ORDER — METFORMIN HYDROCHLORIDE 500 MG/1
1 TABLET WITH A MEAL TABLET, FILM COATED ORAL ONCE A DAY
Status: ACTIVE | COMMUNITY

## 2024-04-04 RX ORDER — SITAGLIPTIN 100 MG/1
1 TABLET TABLET, FILM COATED ORAL ONCE A DAY
Status: ACTIVE | COMMUNITY

## 2024-04-04 RX ORDER — TRAMADOL HYDROCHLORIDE 50 MG/1
1 TABLET AS NEEDED TABLET, FILM COATED ORAL BID
Status: ON HOLD | COMMUNITY

## 2024-04-04 NOTE — HPI-ELEVATED LIVER ENZYMES
Patient is here for a routine follow up for Elevated liver enzymes. Last OV was 6 months ago Detected on labs done several years ago. Patient was initaly referred by PCP due to AMA 41.3  Patient was previously taking Ursodiol 250- 300 mg and discontinued medication since 08/2019. Patient is taking Diphenhydramine HCl 50 mg BID for pruritus.  Recent labs done on 09/21/2023, see below Current symptoms: Patient denies any RUQ pain, abdominal distension, pedal edema, or jaundice

## 2024-04-04 NOTE — HPI-CONSTIPATION
Patient is here to follow for constipation. Last OV 6 months ago Patient was previously on Linzess 145 mcg daily but had 1 BM every 3 days, with type 1 stools on BS scale.  Therefore, advised to increase Linzess to 290 mcg daily but still had 1 BM QOD. Therefore, advised to add Lactulose 15 mL QD along with Linzess 290 mcg daily, but reports frequent loose stools with Lactulose. Therefore, he takes Lactulose every 2 days. Current BM: 1 BM every 3-4 days, type 4 stool on BSC Patient denies rectal bleeding, blood in stool or melena

## 2025-04-03 ENCOUNTER — OFFICE VISIT (OUTPATIENT)
Dept: URBAN - METROPOLITAN AREA CLINIC 37 | Facility: CLINIC | Age: 70
End: 2025-04-03